# Patient Record
Sex: MALE | Race: ASIAN | Employment: STUDENT | URBAN - METROPOLITAN AREA
[De-identification: names, ages, dates, MRNs, and addresses within clinical notes are randomized per-mention and may not be internally consistent; named-entity substitution may affect disease eponyms.]

---
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
## 2020-08-09 ENCOUNTER — HOSPITAL ENCOUNTER (EMERGENCY)
Age: 20
Discharge: HOME | End: 2020-08-09
Attending: EMERGENCY MEDICINE
Payer: MEDICAID

## 2020-08-09 ENCOUNTER — APPOINTMENT (OUTPATIENT)
Dept: GENERAL RADIOLOGY | Age: 20
End: 2020-08-09
Attending: EMERGENCY MEDICINE
Payer: MEDICAID

## 2020-08-09 VITALS
DIASTOLIC BLOOD PRESSURE: 85 MMHG | TEMPERATURE: 98 F | HEIGHT: 74 IN | HEART RATE: 87 BPM | WEIGHT: 290 LBS | SYSTOLIC BLOOD PRESSURE: 160 MMHG | BODY MASS INDEX: 37.22 KG/M2 | OXYGEN SATURATION: 99 % | RESPIRATION RATE: 16 BRPM

## 2020-08-09 DIAGNOSIS — R07.89 CHEST WALL PAIN: Primary | ICD-10-CM

## 2020-08-09 PROCEDURE — 93010 ELECTROCARDIOGRAM REPORT: CPT

## 2020-08-09 PROCEDURE — 99284 EMERGENCY DEPT VISIT MOD MDM: CPT

## 2020-08-09 PROCEDURE — 93005 ELECTROCARDIOGRAM TRACING: CPT

## 2020-08-09 PROCEDURE — 71101 X-RAY EXAM UNILAT RIBS/CHEST: CPT | Performed by: EMERGENCY MEDICINE

## 2020-08-09 PROCEDURE — 99283 EMERGENCY DEPT VISIT LOW MDM: CPT

## 2020-08-09 NOTE — ED INITIAL ASSESSMENT (HPI)
Left rib injury last week, 2 days ago the ribs stopped hurting but then my left upper chest started hurting, hurts to move and take a deep breath, tender to touch, denies cough and fever

## 2020-08-09 NOTE — ED PROVIDER NOTES
Patient Seen in: [facility] In [place]      History   Patient presents with:  Chest Pain Angina    Stated Complaint: \"I hurt my ribs last week while wrestling with friends and fell on the concrete*    HPI    Patient is a pleasant 20- Extraocular Movements: Extraocular movements intact. Pupils: Pupils are equal, round, and reactive to light. Neck:      Musculoskeletal: Normal range of motion. Cardiovascular:      Rate and Rhythm: Normal rate.       Heart sounds: Normal heart PROCEDURE:  XR RIBS WITH CHEST (3 VIEWS), LEFT  (CPT=71101)  TECHNIQUE:  PA Chest and three views of the ribs were obtained  COMPARISON:  [facility], XR, XR CHEST PA + LAT CHEST (CPT=71046), 12/26/2018, 5:10 PM.  INDICATIONS:  I hurt my ribs last week whil Chest wall pain  (primary encounter diagnosis)    Disposition:  Discharge  8/9/2020  3:55 pm    Follow-up:  Louise Navarro  [address] [place] [address]    Schedule an appointment as soon as possible for a visit in 1 week

## 2020-08-10 LAB
ATRIAL RATE: 79 BPM
P AXIS: 37 DEGREES
P-R INTERVAL: 142 MS
Q-T INTERVAL: 366 MS
QRS DURATION: 94 MS
QTC CALCULATION (BEZET): 419 MS
R AXIS: 58 DEGREES
T AXIS: 22 DEGREES
VENTRICULAR RATE: 79 BPM

## (undated) NOTE — ED AVS SNAPSHOT
THE [facility] Emergency Department in [address] [facility]    Phone:  [phone]    Fax:  [phone]           Susy Flower   MRN: [id number]    Department:  THE [facility] Emergency Department in [place]   Date of Visit: a detailed feedback survey mailed to them a week after the visit. If you receive this, we would really appreciate it if you could take the time to complete it. Thank you! You were examined and treated today on an urgent basis only.   This was not a fowler [id number]  [facility] ([address]) [facility] [address] ([address]) [address] ([place]) [address] [facility] [address]

## (undated) NOTE — ED AVS SNAPSHOT
May Segura   MRN: [id number]    Department:  [facility]ospit[facility] in [place]   Date of Visit:  7/11/2019           Disclosure     Insurance plans vary and the physician(s) referred by the ER may not be covered by your plan.  Please contact tell this physician (or your personal doctor if your instructions are to return to your personal doctor) about any new or lasting problems. The primary care or specialist physician will see patients referred from the [facility] Emergency Department.  Deondre Rutherford

## (undated) NOTE — ED AVS SNAPSHOT
Anna Hemphill   MRN: [id number]    Department:  THE [facility] Emergency Department in [place]   Date of Visit:  9/18/2018           Disclosure     Insurance plans vary and the physician(s) referred by the ER may not be covered by your plan.  Please contact tell this physician (or your personal doctor if your instructions are to return to your personal doctor) about any new or lasting problems. The primary care or specialist physician will see patients referred from the [facility] Emergency Department.  Anjel Serna

## (undated) NOTE — ED AVS SNAPSHOT
Hollis Russ   MRN: [id number]    Department:  [facility] Department in [place]   Date of Visit:  3/23/2018           Disclosure     Insurance plans vary and the physician(s) referred by the ER may not be covered by your plan.  Please contact tell this physician (or your personal doctor if your instructions are to return to your personal doctor) about any new or lasting problems. The primary care or specialist physician will see patients referred from the [facility] Emergency Department.  Anjel Serna

## (undated) NOTE — ED AVS SNAPSHOT
Susy Flower   MRN: [id number]    Department:  THE [facility] Emergency Department in [place]   Date of Visit:  4/2/2018           Disclosure     Insurance plans vary and the physician(s) referred by the ER may not be covered by your plan.  Please contact y tell this physician (or your personal doctor if your instructions are to return to your personal doctor) about any new or lasting problems. The primary care or specialist physician will see patients referred from the [facility] Emergency Department.  Elian Dia

## (undated) NOTE — ED AVS SNAPSHOT
Yves Hale   MRN: [id number]    Department:  THE [facility] Emergency Department in [place]   Date of Visit:  12/26/2018           Disclosure     Insurance plans vary and the physician(s) referred by the ER may not be covered by your plan.  Please contact tell this physician (or your personal doctor if your instructions are to return to your personal doctor) about any new or lasting problems. The primary care or specialist physician will see patients referred from the [facility] Emergency Department.  Severo Pastures

## (undated) NOTE — ED AVS SNAPSHOT
THE [facility] Emergency Department in [address] [facility]    Phone:  [phone]    Fax:  [phone]           Kim Raymon   MRN: [id number]    Department:  THE [facility] Emergency Department in [place]   Date of Visit: IF THERE IS ANY CHANGE OR WORSENING OF YOUR CONDITION, CALL YOUR PRIMARY CARE PHYSICIAN AT ONCE OR RETURN IMMEDIATELY TO THE EMERGENCY DEPARTMENT.     If you have been prescribed any medication(s), please fill your prescription right away and begin taking t